# Patient Record
Sex: MALE | Race: OTHER | NOT HISPANIC OR LATINO | URBAN - METROPOLITAN AREA
[De-identification: names, ages, dates, MRNs, and addresses within clinical notes are randomized per-mention and may not be internally consistent; named-entity substitution may affect disease eponyms.]

---

## 2019-11-25 ENCOUNTER — EMERGENCY (EMERGENCY)
Facility: HOSPITAL | Age: 39
LOS: 1 days | Discharge: HOME | End: 2019-11-25
Attending: EMERGENCY MEDICINE | Admitting: EMERGENCY MEDICINE
Payer: SELF-PAY

## 2019-11-25 VITALS
SYSTOLIC BLOOD PRESSURE: 133 MMHG | OXYGEN SATURATION: 97 % | HEART RATE: 81 BPM | RESPIRATION RATE: 18 BRPM | DIASTOLIC BLOOD PRESSURE: 69 MMHG | TEMPERATURE: 98 F

## 2019-11-25 VITALS
HEIGHT: 70 IN | WEIGHT: 184.97 LBS | RESPIRATION RATE: 15 BRPM | SYSTOLIC BLOOD PRESSURE: 120 MMHG | DIASTOLIC BLOOD PRESSURE: 68 MMHG | TEMPERATURE: 98 F | HEART RATE: 100 BPM | OXYGEN SATURATION: 99 %

## 2019-11-25 PROCEDURE — 99053 MED SERV 10PM-8AM 24 HR FAC: CPT

## 2019-11-25 PROCEDURE — 93010 ELECTROCARDIOGRAM REPORT: CPT

## 2019-11-25 PROCEDURE — 99284 EMERGENCY DEPT VISIT MOD MDM: CPT

## 2019-11-25 NOTE — ED PROVIDER NOTE - OBJECTIVE STATEMENT
38 y/o M BIBEMS for AMS s/p ingestion of unknown amount of GHB (found on his person). Placed in stretcher and quickly falls asleep.  Unable to cooperate with remainder of history.

## 2019-11-25 NOTE — ED PEDIATRIC NURSE REASSESSMENT NOTE - NS ED NURSE REASSESS COMMENT FT2
Pt AAOx3 at this time. Pt that the last thing he remembers is taking a drink from a someone at the bar and not feeling well after drinking it. PT states that at this time he is feeling fine. Pt has plan on how to get back to hotel.

## 2019-11-25 NOTE — ED PROVIDER NOTE - PATIENT PORTAL LINK FT
You can access the FollowMyHealth Patient Portal offered by [facility] by registering at the following website: [email or web address]. By joining [employer]’s FollowMyHealth portal, you will also be able to view your health information using other applications (apps) compatible with our system.

## 2019-11-25 NOTE — ED PROVIDER NOTE - CLINICAL SUMMARY MEDICAL DECISION MAKING FREE TEXT BOX
Clinically sober at time of discharge ambulating w/steady gait. No c/o pain or trauma. d/c home with return precautions.

## 2019-11-25 NOTE — ED ADULT NURSE NOTE - OBJECTIVE STATEMENT
Pt is a 39y male complaining of altered mental status. Pt responsive to tactile stimuli, but quickly falls asleep. No obvious signs of trauma noted. Chest rise equal and nonlabored.

## 2019-11-25 NOTE — ED ADULT NURSE NOTE - NSIMPLEMENTINTERV_GEN_ALL_ED
Implemented All Fall with Harm Risk Interventions:  [place] to call system. Call bell, personal items and telephone within reach. Instruct patient to call for assistance. Room bathroom lighting operational. Non-slip footwear when patient is off stretcher. Physically safe environment: no spills, clutter or unnecessary equipment. Stretcher in lowest position, wheels locked, appropriate side rails in place. Provide visual cue, wrist band, yellow gown, etc. Monitor gait and stability. Monitor for mental status changes and reorient to person, place, and time. Review medications for side effects contributing to fall risk. Reinforce activity limits and safety measures with patient and family. Provide visual clues: red socks.

## 2019-11-25 NOTE — ED PROVIDER NOTE - NSFOLLOWUPINSTRUCTIONS_ED_ALL_ED_FT
Log Out.    [employer] CareNotes®     :  [facility]             NARCOTIC USE DISORDER - AfterCare(R) Instructions(ER/ED)     Narcotic Use Disorder    WHAT YOU NEED TO KNOW:    Narcotic use disorder (NUD) is a condition that causes you to abuse and become dependent on a narcotic. Abuse means you continue to use the narcotic even though it is hurting you or others. Dependence means your body gets used to the amount you take. NUD prevents you from controlling your narcotic use. This causes distress that affects your life. You also have trouble doing daily activities because of physical and mental problems from the narcotic. NUD can happen with an illegal narcotic such as heroin, or a prescription narcotic such as hydrocodone or fentanyl.    DISCHARGE INSTRUCTIONS:    Call your local emergency number (911 in the ), or have someone else call if:     You have chest pain or trouble breathing.      You have a seizure.      You cannot be woken.    Call your doctor if:     You have trouble staying awake and your breathing is slow or shallow.      You have a fast, slow, or irregular heartbeat.      You feel lightheaded or faint.      Your speech is slurred, or you are confused.      You have nausea and are vomiting, or you cannot stop vomiting.      You have balance problems.      You have questions or concerns about your condition or care.    What you need to know about narcotic medicine safety:     Do not suddenly stop taking the narcotic. If you have been taking the narcotic for longer than 2 weeks, a sudden stop may cause dangerous side effects. Work with your healthcare provider to decrease your dose slowly.      Do not take narcotics that belong to someone else. The kind or amount that person takes may not be right for you.      Do not mix narcotics with other medicines or alcohol. The combination can cause an overdose, or cause you to stop breathing. Alcohol, sleeping pills, and medicines such as antihistamines can make you sleepy. A combination with narcotics can lead to a coma.      Learn about the signs of an overdose so you know how to respond. Tell others about these signs so they will know what to do if needed. Talk to your healthcare provider about naloxone. You may be able to keep naloxone at home in case of an overdose. Your family and friends can also be trained on how to give it to you if needed.      Take prescribed narcotics exactly as directed. Do not take more than the recommended amount. Do not take it more often than recommended. If you use a pain patch, be sure to remove the old patch before you place a new one. Make sure the patch is not exposed to sunlight. Sunlight speeds up the narcotic release from the patch.      Keep narcotics out of the reach of children. Store narcotics in a locked cabinet or in a location that children cannot get to.      Follow instructions for what to do with prescription narcotics you do not use. Your healthcare provider will give you instructions for how to dispose of it safely. This helps make sure no one else takes it.    Follow up with your doctor or therapist as directed: Write down your questions so you remember to ask them during your visits.    For support and more information:     [employer]stance Abuse [facility] Mental [employer] [facility]  [address]  [place] [address]  Web Address: http://[email or web address].gov           © Copyright [employer] 2019       back to top                      © Copyright [employer] 2019

## 2019-11-25 NOTE — ED ADULT TRIAGE NOTE - CHIEF COMPLAINT QUOTE
Pt biba for found sleeping on sidewalk outside of a club. Found with GHB paraphernalia. Responsive with moaning to painful stimuli. Respirations are even and unlabored. No obvious injuries or deformities noted.

## 2019-11-29 DIAGNOSIS — R53.83 OTHER FATIGUE: ICD-10-CM

## 2019-11-29 DIAGNOSIS — R41.82 ALTERED MENTAL STATUS, UNSPECIFIED: ICD-10-CM

## 2019-11-29 DIAGNOSIS — T50.992A POISONING BY OTHER DRUGS, MEDICAMENTS AND BIOLOGICAL SUBSTANCES, INTENTIONAL SELF-HARM, INITIAL ENCOUNTER: ICD-10-CM
